# Patient Record
Sex: MALE | Race: WHITE | NOT HISPANIC OR LATINO | Employment: OTHER | URBAN - NONMETROPOLITAN AREA
[De-identification: names, ages, dates, MRNs, and addresses within clinical notes are randomized per-mention and may not be internally consistent; named-entity substitution may affect disease eponyms.]

---

## 2023-11-10 ENCOUNTER — OFFICE VISIT (OUTPATIENT)
Dept: URGENT CARE | Facility: CLINIC | Age: 68
End: 2023-11-10
Payer: MEDICARE

## 2023-11-10 ENCOUNTER — HOSPITAL ENCOUNTER (EMERGENCY)
Facility: HOSPITAL | Age: 68
Discharge: HOME | End: 2023-11-10
Attending: EMERGENCY MEDICINE
Payer: MEDICARE

## 2023-11-10 ENCOUNTER — APPOINTMENT (EMERGENCY)
Dept: RADIOLOGY | Facility: HOSPITAL | Age: 68
End: 2023-11-10
Payer: MEDICARE

## 2023-11-10 ENCOUNTER — HOSPITAL ENCOUNTER (OUTPATIENT)
Dept: RADIOLOGY | Facility: CLINIC | Age: 68
End: 2023-11-10
Payer: MEDICARE

## 2023-11-10 VITALS
WEIGHT: 265 LBS | RESPIRATION RATE: 22 BRPM | OXYGEN SATURATION: 94 % | SYSTOLIC BLOOD PRESSURE: 100 MMHG | HEART RATE: 62 BPM | TEMPERATURE: 98.1 F | DIASTOLIC BLOOD PRESSURE: 70 MMHG

## 2023-11-10 VITALS
OXYGEN SATURATION: 97 % | RESPIRATION RATE: 12 BRPM | SYSTOLIC BLOOD PRESSURE: 140 MMHG | TEMPERATURE: 97.5 F | DIASTOLIC BLOOD PRESSURE: 66 MMHG | WEIGHT: 265 LBS | HEART RATE: 71 BPM

## 2023-11-10 DIAGNOSIS — R06.02 SOB (SHORTNESS OF BREATH): Primary | ICD-10-CM

## 2023-11-10 DIAGNOSIS — R06.2 WHEEZING: ICD-10-CM

## 2023-11-10 DIAGNOSIS — R06.02 SOB (SHORTNESS OF BREATH): ICD-10-CM

## 2023-11-10 DIAGNOSIS — J18.9 PNEUMONIA: Primary | ICD-10-CM

## 2023-11-10 LAB
ALBUMIN SERPL BCP-MCNC: 4.3 G/DL (ref 3.5–5)
ALP SERPL-CCNC: 81 U/L (ref 34–104)
ALT SERPL W P-5'-P-CCNC: 22 U/L (ref 7–52)
ANION GAP SERPL CALCULATED.3IONS-SCNC: 8 MMOL/L
AST SERPL W P-5'-P-CCNC: 19 U/L (ref 13–39)
BASE EX.OXY STD BLDV CALC-SCNC: 51.7 % (ref 60–80)
BASE EXCESS BLDV CALC-SCNC: 1.1 MMOL/L
BASOPHILS # BLD AUTO: 0.05 THOUSANDS/ÂΜL (ref 0–0.1)
BASOPHILS NFR BLD AUTO: 1 % (ref 0–1)
BILIRUB SERPL-MCNC: 0.87 MG/DL (ref 0.2–1)
BNP SERPL-MCNC: 65 PG/ML (ref 0–100)
BUN SERPL-MCNC: 14 MG/DL (ref 5–25)
CALCIUM SERPL-MCNC: 9.4 MG/DL (ref 8.4–10.2)
CARDIAC TROPONIN I PNL SERPL HS: 7 NG/L
CHLORIDE SERPL-SCNC: 100 MMOL/L (ref 96–108)
CO2 SERPL-SCNC: 28 MMOL/L (ref 21–32)
CREAT SERPL-MCNC: 1.1 MG/DL (ref 0.6–1.3)
EOSINOPHIL # BLD AUTO: 0.49 THOUSAND/ÂΜL (ref 0–0.61)
EOSINOPHIL NFR BLD AUTO: 7 % (ref 0–6)
ERYTHROCYTE [DISTWIDTH] IN BLOOD BY AUTOMATED COUNT: 12.9 % (ref 11.6–15.1)
FLUAV RNA RESP QL NAA+PROBE: NEGATIVE
FLUBV RNA RESP QL NAA+PROBE: NEGATIVE
GFR SERPL CREATININE-BSD FRML MDRD: 69 ML/MIN/1.73SQ M
GLUCOSE SERPL-MCNC: 98 MG/DL (ref 65–140)
HCO3 BLDV-SCNC: 28.7 MMOL/L (ref 24–30)
HCT VFR BLD AUTO: 52 % (ref 36.5–49.3)
HGB BLD-MCNC: 17.3 G/DL (ref 12–17)
IMM GRANULOCYTES # BLD AUTO: 0.02 THOUSAND/UL (ref 0–0.2)
IMM GRANULOCYTES NFR BLD AUTO: 0 % (ref 0–2)
LACTATE SERPL-SCNC: 1.7 MMOL/L (ref 0.5–2)
LYMPHOCYTES # BLD AUTO: 1.08 THOUSANDS/ÂΜL (ref 0.6–4.47)
LYMPHOCYTES NFR BLD AUTO: 16 % (ref 14–44)
MCH RBC QN AUTO: 31 PG (ref 26.8–34.3)
MCHC RBC AUTO-ENTMCNC: 33.3 G/DL (ref 31.4–37.4)
MCV RBC AUTO: 93 FL (ref 82–98)
MONOCYTES # BLD AUTO: 0.88 THOUSAND/ÂΜL (ref 0.17–1.22)
MONOCYTES NFR BLD AUTO: 13 % (ref 4–12)
NEUTROPHILS # BLD AUTO: 4.33 THOUSANDS/ÂΜL (ref 1.85–7.62)
NEUTS SEG NFR BLD AUTO: 63 % (ref 43–75)
NRBC BLD AUTO-RTO: 0 /100 WBCS
O2 CT BLDV-SCNC: 13.2 ML/DL
PCO2 BLDV: 55.8 MM HG (ref 42–50)
PH BLDV: 7.33 [PH] (ref 7.3–7.4)
PLATELET # BLD AUTO: 213 THOUSANDS/UL (ref 149–390)
PMV BLD AUTO: 9.8 FL (ref 8.9–12.7)
PO2 BLDV: 30.6 MM HG (ref 35–45)
POTASSIUM SERPL-SCNC: 4 MMOL/L (ref 3.5–5.3)
PROT SERPL-MCNC: 6.7 G/DL (ref 6.4–8.4)
RBC # BLD AUTO: 5.58 MILLION/UL (ref 3.88–5.62)
RSV RNA RESP QL NAA+PROBE: NEGATIVE
SARS-COV-2 RNA RESP QL NAA+PROBE: NEGATIVE
SODIUM SERPL-SCNC: 136 MMOL/L (ref 135–147)
WBC # BLD AUTO: 6.85 THOUSAND/UL (ref 4.31–10.16)

## 2023-11-10 PROCEDURE — G0463 HOSPITAL OUTPT CLINIC VISIT: HCPCS

## 2023-11-10 PROCEDURE — 84484 ASSAY OF TROPONIN QUANT: CPT | Performed by: EMERGENCY MEDICINE

## 2023-11-10 PROCEDURE — 71046 X-RAY EXAM CHEST 2 VIEWS: CPT

## 2023-11-10 PROCEDURE — 80053 COMPREHEN METABOLIC PANEL: CPT | Performed by: EMERGENCY MEDICINE

## 2023-11-10 PROCEDURE — 83605 ASSAY OF LACTIC ACID: CPT | Performed by: EMERGENCY MEDICINE

## 2023-11-10 PROCEDURE — 82805 BLOOD GASES W/O2 SATURATION: CPT | Performed by: EMERGENCY MEDICINE

## 2023-11-10 PROCEDURE — 85025 COMPLETE CBC W/AUTO DIFF WBC: CPT | Performed by: EMERGENCY MEDICINE

## 2023-11-10 PROCEDURE — 93005 ELECTROCARDIOGRAM TRACING: CPT

## 2023-11-10 PROCEDURE — 83880 ASSAY OF NATRIURETIC PEPTIDE: CPT | Performed by: EMERGENCY MEDICINE

## 2023-11-10 PROCEDURE — 36415 COLL VENOUS BLD VENIPUNCTURE: CPT | Performed by: EMERGENCY MEDICINE

## 2023-11-10 PROCEDURE — 87040 BLOOD CULTURE FOR BACTERIA: CPT | Performed by: EMERGENCY MEDICINE

## 2023-11-10 PROCEDURE — 99285 EMERGENCY DEPT VISIT HI MDM: CPT | Performed by: EMERGENCY MEDICINE

## 2023-11-10 PROCEDURE — 99203 OFFICE O/P NEW LOW 30 MIN: CPT

## 2023-11-10 PROCEDURE — 96374 THER/PROPH/DIAG INJ IV PUSH: CPT

## 2023-11-10 PROCEDURE — 0241U HB NFCT DS VIR RESP RNA 4 TRGT: CPT | Performed by: EMERGENCY MEDICINE

## 2023-11-10 PROCEDURE — 99285 EMERGENCY DEPT VISIT HI MDM: CPT

## 2023-11-10 RX ORDER — PANTOPRAZOLE SODIUM 40 MG/1
40 TABLET, DELAYED RELEASE ORAL DAILY
COMMUNITY

## 2023-11-10 RX ORDER — LEVOFLOXACIN 750 MG/1
750 TABLET ORAL EVERY 24 HOURS
Qty: 7 TABLET | Refills: 0 | Status: SHIPPED | OUTPATIENT
Start: 2023-11-10 | End: 2023-11-17

## 2023-11-10 RX ORDER — ERGOCALCIFEROL 1.25 MG/1
CAPSULE ORAL
COMMUNITY
Start: 2023-09-11

## 2023-11-10 RX ORDER — FEXOFENADINE HCL 180 MG/1
180 TABLET ORAL DAILY
COMMUNITY

## 2023-11-10 RX ORDER — IPRATROPIUM BROMIDE AND ALBUTEROL SULFATE 2.5; .5 MG/3ML; MG/3ML
3 SOLUTION RESPIRATORY (INHALATION) ONCE
Status: COMPLETED | OUTPATIENT
Start: 2023-11-10 | End: 2023-11-10

## 2023-11-10 RX ORDER — FLUTICASONE PROPIONATE 110 UG/1
2 AEROSOL, METERED RESPIRATORY (INHALATION) 2 TIMES DAILY
COMMUNITY
Start: 2023-06-01

## 2023-11-10 RX ORDER — PREDNISONE 20 MG/1
20 TABLET ORAL DAILY
Qty: 5 TABLET | Refills: 0 | Status: SHIPPED | OUTPATIENT
Start: 2023-11-10 | End: 2023-11-15

## 2023-11-10 RX ORDER — LEVOFLOXACIN 750 MG/1
750 TABLET ORAL ONCE
Status: COMPLETED | OUTPATIENT
Start: 2023-11-10 | End: 2023-11-10

## 2023-11-10 RX ORDER — METHYLPREDNISOLONE SODIUM SUCCINATE 125 MG/2ML
80 INJECTION, POWDER, LYOPHILIZED, FOR SOLUTION INTRAMUSCULAR; INTRAVENOUS ONCE
Status: COMPLETED | OUTPATIENT
Start: 2023-11-10 | End: 2023-11-10

## 2023-11-10 RX ORDER — CLOPIDOGREL BISULFATE 75 MG/1
75 TABLET ORAL DAILY
COMMUNITY

## 2023-11-10 RX ORDER — ASPIRIN 81 MG/1
TABLET ORAL
COMMUNITY

## 2023-11-10 RX ORDER — ATORVASTATIN CALCIUM 80 MG/1
TABLET, FILM COATED ORAL
COMMUNITY
Start: 2023-10-04

## 2023-11-10 RX ORDER — LEVOTHYROXINE SODIUM 25 MCG
TABLET ORAL
COMMUNITY

## 2023-11-10 RX ORDER — LEVOTHYROXINE SODIUM 200 MCG
TABLET ORAL
COMMUNITY

## 2023-11-10 RX ORDER — ALBUTEROL SULFATE 90 UG/1
2 AEROSOL, METERED RESPIRATORY (INHALATION) EVERY 6 HOURS PRN
Qty: 6.7 G | Refills: 0 | Status: SHIPPED | OUTPATIENT
Start: 2023-11-10

## 2023-11-10 RX ADMIN — IPRATROPIUM BROMIDE AND ALBUTEROL SULFATE 3 ML: 2.5; .5 SOLUTION RESPIRATORY (INHALATION) at 14:40

## 2023-11-10 RX ADMIN — METHYLPREDNISOLONE SODIUM SUCCINATE 80 MG: 125 INJECTION, POWDER, FOR SOLUTION INTRAMUSCULAR; INTRAVENOUS at 14:38

## 2023-11-10 RX ADMIN — IPRATROPIUM BROMIDE AND ALBUTEROL SULFATE 3 ML: 2.5; .5 SOLUTION RESPIRATORY (INHALATION) at 12:56

## 2023-11-10 RX ADMIN — LEVOFLOXACIN 750 MG: 750 TABLET, FILM COATED ORAL at 15:22

## 2023-11-10 NOTE — ED PROVIDER NOTES
History  Chief Complaint   Patient presents with    Shortness of Breath     Patient presents ot the ED with complaints of cold symptoms since Wednesday. Today he feels short of breath. History provided by:  Medical records and patient  URI  Presenting symptoms: congestion, cough, fatigue, fever, rhinorrhea and sore throat    Severity:  Moderate  Onset quality:  Gradual  Duration:  1 day  Timing:  Constant  Progression:  Unchanged  Chronicity:  New  Relieved by:  Nebulizer treatments (Patient was in urgent care prior to arrival for similar symptoms, was given a breathing treatment, he had oxygen level of 94% on room air, after treatment went down to 90%. Sent to the emergency room for further evaluation)  Worsened by:  Nothing  Ineffective treatments:  None tried  Associated symptoms: no arthralgias    Risk factors: chronic cardiac disease and sick contacts        Prior to Admission Medications   Prescriptions Last Dose Informant Patient Reported? Taking? Synthroid 200 MCG tablet   Yes No   Sig: TAKE 1 TABLET (200 MCG TOTAL) BY MOUTH DAILY. TAKE 1 DAILY WITH 25MCG TAB   Synthroid 25 MCG tablet   Yes No   Sig: TAKE 1 TABLET (25 MCG TOTAL) BY MOUTH DAILY. WITH 200 MCG TO = 225 MCG DAILY.    aspirin (ECOTRIN LOW STRENGTH) 81 mg EC tablet   Yes No   Sig: take 1 tablet (81 mg) by oral route once daily   atorvastatin (LIPITOR) 80 mg tablet   Yes No   Sig: TAKE 1 TABLET (80 MG) BY MOUTH IN THE MORNING   clopidogrel (PLAVIX) 75 mg tablet   Yes No   Sig: Take 75 mg by mouth daily   ergocalciferol (ERGOCALCIFEROL) 1.25 MG (00801 UT) capsule   Yes No   Sig: TAKE 1 CAPSULE BY MOUTH ONE TIME PER WEEK   fexofenadine (ALLEGRA) 180 MG tablet   Yes No   Sig: Take 180 mg by mouth daily   fluticasone (FLOVENT HFA) 110 MCG/ACT inhaler   Yes No   Sig: Inhale 2 puffs 2 (two) times a day   metoprolol tartrate (LOPRESSOR) 25 mg tablet   Yes No   Sig: TAKE 1 TABLET BY MOUTH IN THE MORNING AND BEFORE BEDTIME   pantoprazole (PROTONIX) 40 mg tablet   Yes No   Sig: Take 40 mg by mouth daily      Facility-Administered Medications Last Administration Doses Remaining   ipratropium-albuterol (DUO-NEB) 0.5-2.5 mg/3 mL inhalation solution 3 mL 11/10/2023 12:56 PM 0          Past Medical History:   Diagnosis Date    Aortic aneurysm (HCC)     Disease of thyroid gland     Myocardial infarction [facility])        Past Surgical History:   Procedure Laterality Date    CARDIAC SURGERY      CHOLECYSTECTOMY      HERNIA REPAIR      REPLACEMENT TOTAL KNEE BILATERAL      US GUIDED VASCULAR ACCESS  8/17/2022       History reviewed. No pertinent family history. I have reviewed and agree with the history as documented. E-Cigarette/Vaping    E-Cigarette Use Never User      E-Cigarette/Vaping Substances     Social History     Tobacco Use    Smoking status: Never    Smokeless tobacco: Never   Vaping Use    Vaping Use: Never used   Substance Use Topics    Alcohol use: Never    Drug use: Never       Review of Systems   Constitutional:  Positive for fatigue and fever. Negative for appetite change and chills. HENT:  Positive for congestion, rhinorrhea and sore throat. Negative for trouble swallowing. Eyes:  Negative for pain, discharge and visual disturbance. Respiratory:  Positive for cough. Cardiovascular:  Negative for palpitations and leg swelling. Gastrointestinal:  Negative for nausea. Endocrine: Negative for polydipsia, polyphagia and polyuria. Genitourinary:  Negative for difficulty urinating, dysuria, hematuria and testicular pain. Musculoskeletal:  Negative for arthralgias and back pain. Skin:  Negative for color change. Allergic/Immunologic: Negative for immunocompromised state. Neurological:  Negative for dizziness, seizures, syncope and weakness. Hematological:  Negative for adenopathy. Psychiatric/Behavioral:  Negative for confusion and dysphoric mood. All other systems reviewed and are negative.       Physical Exam  Physical Exam  Vitals and nursing note reviewed. Constitutional:       General: He is not in acute distress. Appearance: Normal appearance. He is obese. He is not ill-appearing, toxic-appearing or diaphoretic. HENT:      Head: Normocephalic and atraumatic. Right Ear: Tympanic membrane, ear canal and external ear normal. There is no impacted cerumen. Left Ear: Tympanic membrane, ear canal and external ear normal. There is no impacted cerumen. Nose: Nose normal. No congestion or rhinorrhea. Mouth/Throat:      Mouth: Mucous membranes are moist.      Pharynx: Oropharynx is clear. No oropharyngeal exudate or posterior oropharyngeal erythema. Eyes:      General:         Right eye: No discharge. Left eye: No discharge. Cardiovascular:      Rate and Rhythm: Normal rate and regular rhythm. Pulses: Normal pulses. Heart sounds: Normal heart sounds. No murmur heard. No gallop. Comments: Mid sternotomy scar well-healed  Pulmonary:      Effort: Pulmonary effort is normal. No respiratory distress. Breath sounds: No stridor. Wheezing and rales present. No rhonchi. Comments: Diffuse mild expiratory wheezing, bibasilar Rales significantly louder in the right lower base  Chest:      Chest wall: No tenderness. Abdominal:      General: Bowel sounds are normal. There is no distension. Palpations: Abdomen is soft. There is no mass. Tenderness: There is no abdominal tenderness. There is no right CVA tenderness, left CVA tenderness, guarding or rebound. Hernia: No hernia is present. Musculoskeletal:         General: Normal range of motion. Cervical back: Normal range of motion and neck supple. Skin:     General: Skin is warm and dry. Capillary Refill: Capillary refill takes less than 2 seconds. Neurological:      General: No focal deficit present. Mental Status: He is alert and oriented to person, place, and time. Cranial Nerves:  No cranial nerve deficit. Sensory: No sensory deficit. Motor: No weakness. Coordination: Coordination normal.      Gait: Gait normal.      Deep Tendon Reflexes: Reflexes normal.   Psychiatric:         Mood and Affect: Mood normal.         Behavior: Behavior normal.         Thought Content: Thought content normal.         Judgment: Judgment normal.         Vital Signs  ED Triage Vitals [11/10/23 1406]   Temperature Pulse Respirations Blood Pressure SpO2   97.5 °F (36.4 °C) 67 20 (!) 174/84 94 %      Temp Source Heart Rate Source Patient Position - Orthostatic VS BP Location FiO2 (%)   Temporal Monitor Sitting Left arm --      Pain Score       --           Vitals:    11/10/23 1406 11/10/23 1445 11/10/23 1500   BP: (!) 174/84 134/67 140/66   Pulse: 67 67 71   Patient Position - Orthostatic VS: Sitting Lying Lying         Visual Acuity      ED Medications  Medications   ipratropium-albuterol (DUO-NEB) 0.5-2.5 mg/3 mL inhalation solution 3 mL (3 mL Nebulization Given 11/10/23 1440)   methylPREDNISolone sodium succinate (Solu-MEDROL) injection 80 mg (80 mg Intravenous Given 11/10/23 1438)   levofloxacin (LEVAQUIN) tablet 750 mg (750 mg Oral Given 11/10/23 1522)       Diagnostic Studies  Results Reviewed       Procedure Component Value Units Date/Time    FLU/RSV/COVID - if FLU/RSV clinically relevant [[id number]]  (Normal) Collected: 11/10/23 1424    Lab Status: Final result Specimen: Nares from Nose Updated: 11/10/23 1513     SARS-CoV-2 Negative     INFLUENZA A PCR Negative     INFLUENZA B PCR Negative     RSV PCR Negative    Narrative:      FOR PEDIATRIC PATIENTS - copy/paste COVID Guidelines URL to browser: [email or web address]    SARS-CoV-2 assay is a Nucleic Acid Amplification assay intended for the  qualitative detection of nucleic acid from SARS-CoV-2 in nasopharyngeal  swabs.  Results are for the presumptive identification of SARS-CoV-2 RNA.    Positive results are indicative of infection with SARS-CoV-2, the virus  causing COVID-19, but do not rule out bacterial infection or co-infection  with other viruses. Laboratories within the [facility] and its  territories are required to report all positive results to the appropriate  public health authorities. Negative results do not preclude SARS-CoV-2  infection and should not be used as the sole basis for treatment or other  patient management decisions. Negative results must be combined with  clinical observations, patient history, and epidemiological information. This test has not been FDA cleared or approved. This test has been authorized by FDA under an Emergency Use Authorization  (EUA). This test is only authorized for the duration of time the  declaration that circumstances exist justifying the authorization of the  emergency use of an in vitro diagnostic tests for detection of SARS-CoV-2  virus and/or diagnosis of COVID-19 infection under section 564(b)(1) of  the Act, 21 U. S.C. 165RMG-5(B)(7), unless the authorization is terminated  or revoked sooner. The test has been validated but independent review by FDA  and CLIA is pending. Test performed using [employer] GeneXpert: This RT-PCR assay targets N2,  a region unique to SARS-CoV-2. A conserved region in the E-gene was chosen  for pan-Sarbecovirus detection which includes SARS-CoV-2. According to CMS-2020-01-R, this platform meets the definition of high-throughput technology.     B-Type Natriuretic Peptide(BNP) [[id number]]  (Normal) Collected: 11/10/23 1424    Lab Status: Final result Specimen: Blood from Arm, Left Updated: 11/10/23 1459     BNP 65 pg/mL     HS Troponin 0hr (reflex protocol) [[id number]]  (Normal) Collected: 11/10/23 1424    Lab Status: Final result Specimen: Blood from Arm, Left Updated: 11/10/23 1459     hs TnI 0hr 7 ng/L     Comprehensive metabolic panel [[id number]] Collected: 11/10/23 1424    Lab Status: Final result Specimen: Blood from Arm, Left Updated: 11/10/23 1456     Sodium 136 mmol/L      Potassium 4.0 mmol/L      Chloride 100 mmol/L      CO2 28 mmol/L      ANION GAP 8 mmol/L      BUN 14 mg/dL      Creatinine 1.10 mg/dL      Glucose 98 mg/dL      Calcium 9.4 mg/dL      AST 19 U/L      ALT 22 U/L      Alkaline Phosphatase 81 U/L      Total Protein 6.7 g/dL      Albumin 4.3 g/dL      Total Bilirubin 0.87 mg/dL      eGFR 69 ml/min/1.73sq m     Narrative:      [facility] (CKD):     Stage 1 with normal or high GFR (GFR > 90 mL/min/1.73 square meters)    Stage 2 Mild CKD (GFR = 60-89 mL/min/1.73 square meters)    Stage 3A Moderate CKD (GFR = 45-59 mL/min/1.73 square meters)    Stage 3B Moderate CKD (GFR = 30-44 mL/min/1.73 square meters)    Stage 4 Severe CKD (GFR = 15-29 mL/min/1.73 square meters)    Stage 5 End Stage CKD (GFR <15 mL/min/1.73 square meters)  Note: GFR calculation is accurate only with a steady state creatinine    Lactic acid, plasma (w/reflex if result > 2.0) [[id number]]  (Normal) Collected: 11/10/23 1424    Lab Status: Final result Specimen: Blood from Arm, Left Updated: 11/10/23 1454     LACTIC ACID 1.7 mmol/L     Narrative:      Result may be elevated if tourniquet was used during collection. Blood culture #1 [[id number]] Collected: 11/10/23 1436    Lab Status:  In process Specimen: Blood from Arm, Right Updated: 11/10/23 1438    Blood gas, venous [[id number]]  (Abnormal) Collected: 11/10/23 1424    Lab Status: Final result Specimen: Blood from Arm, Left Updated: 11/10/23 1437     pH, Andrews 7.329     pCO2, Andrews 55.8 mm Hg      pO2, Andrews 30.6 mm Hg      HCO3, Andrews 28.7 mmol/L      Base Excess, Andrews 1.1 mmol/L      O2 Content, Andrews 13.2 ml/dL      O2 HGB, VENOUS 51.7 %     CBC and differential [[id number]]  (Abnormal) Collected: 11/10/23 1424    Lab Status: Final result Specimen: Blood from Arm, Left Updated: 11/10/23 1435     WBC 6.85 Thousand/uL RBC 5.58 Million/uL      Hemoglobin 17.3 g/dL      Hematocrit 52.0 %      MCV 93 fL      MCH 31.0 pg      MCHC 33.3 g/dL      RDW 12.9 %      MPV 9.8 fL      Platelets 085 Thousands/uL      nRBC 0 /100 WBCs      Neutrophils Relative 63 %      Immat GRANS % 0 %      Lymphocytes Relative 16 %      Monocytes Relative 13 %      Eosinophils Relative 7 %      Basophils Relative 1 %      Neutrophils Absolute 4.33 Thousands/µL      Immature Grans Absolute 0.02 Thousand/uL      Lymphocytes Absolute 1.08 Thousands/µL      Monocytes Absolute 0.88 Thousand/µL      Eosinophils Absolute 0.49 Thousand/µL      Basophils Absolute 0.05 Thousands/µL     Blood culture #2 [[id number]] Collected: 11/10/23 1424    Lab Status: In process Specimen: Blood from Arm, Left Updated: 11/10/23 1432                   No orders to display              Procedures  Procedures         ED Course                               SBIRT 22yo+      Flowsheet Row Most Recent Value   Initial Alcohol Screen: US AUDIT-C     1. How often do you have a drink containing alcohol? 0 Filed at: 11/10/2023 1408   2. How many drinks containing alcohol do you have on a typical day you are drinking? 0 Filed at: 11/10/2023 1408   3a. Male UNDER 65: How often do you have five or more drinks on one occasion? 0 Filed at: 11/10/2023 1408   3b. FEMALE Any Age, or MALE 65+: How often do you have 4 or more drinks on one occassion? 0 Filed at: 11/10/2023 1408   Audit-C Score 0 Filed at: 11/10/2023 1408   MAXIMUS: How many times in the past year have you. .. Used an illegal drug or used a prescription medication for non-medical reasons? Never Filed at: 11/10/2023 [address] [place] [address]: Patient appears well, vital signs reviewed. Patient with URI symptoms, new onset wheezing and noted to have some Rales to the right base. Concerns for pneumonia. Placed on a monitor. Plan to complete basic labs including cardiac enzymes, BNP.   EKG nonischemic reports atrial fibrillation on heading however appears to have normal sinus rhythm with right bundle branch block and several PVCs. Check chest x-ray. I will give DuoNeb and Solu-Medrol, reevaluate. No DVT/PE symptomatology or stigmata. 1510: Chest x-ray and labs reviewed. Patient feels much better with above treatment. Stable for outpatient course of care. Will start on antibiotics for presumed bronchitis/pneumonia. Encouraged to return to the emergency room if his symptoms do not improve. Amount and/or Complexity of Data Reviewed  Labs: ordered. Radiology: ordered and independent interpretation performed. Details: Chest x-ray from urgent care without acute pathology  ECG/medicine tests: ordered and independent interpretation performed. Details: Normal sinus rhythm with right bundle branch block 65 bpm, no acute ischemia, Q waves inferiorly. Risk  Prescription drug management. Disposition  Final diagnoses:   Pneumonia   Wheezing     Time reflects when diagnosis was documented in both MDM as applicable and the Disposition within this note       Time User Action Codes Description Comment    11/10/2023  3:06 PM Katia Endo Add [J18.9] Pneumonia     11/10/2023  3:06 PM Katia Endo Add [R06.2] Wheezing           ED Disposition       ED Disposition   Discharge    Condition   Stable    Date/Time   Fri Nov 10, 2023 [facility] discharge to home/self care.                    Follow-up Information       Follow up With Specialties Details Why Contact Info    Stephan Godfrey MD Internal Medicine Schedule an appointment as soon as possible for a visit   [facility] [phone]              Discharge Medication List as of 11/10/2023  3:10 PM        START taking these medications    Details   albuterol (Proventil HFA) 90 mcg/act inhaler Inhale 2 puffs every 6 (six) hours as needed for wheezing, Starting Fri 11/10/2023, Normal levofloxacin (LEVAQUIN) 750 mg tablet Take 1 tablet (750 mg total) by mouth every 24 hours for 7 days, Starting Fri 11/10/2023, Until Fri 11/17/2023, Normal      predniSONE 20 mg tablet Take 1 tablet (20 mg total) by mouth daily for 5 days, Starting Fri 11/10/2023, Until Wed 11/15/2023, Normal           CONTINUE these medications which have NOT CHANGED    Details   aspirin (ECOTRIN LOW STRENGTH) 81 mg EC tablet take 1 tablet (81 mg) by oral route once daily, Historical Med      atorvastatin (LIPITOR) 80 mg tablet TAKE 1 TABLET (80 MG) BY MOUTH IN THE MORNING, Historical Med      clopidogrel (PLAVIX) 75 mg tablet Take 75 mg by mouth daily, Historical Med      ergocalciferol (ERGOCALCIFEROL) 1.25 MG (40629 UT) capsule TAKE 1 CAPSULE BY MOUTH ONE TIME PER WEEK, Historical Med      fexofenadine (ALLEGRA) 180 MG tablet Take 180 mg by mouth daily, Historical Med      fluticasone (FLOVENT HFA) 110 MCG/ACT inhaler Inhale 2 puffs 2 (two) times a day, Starting Thu 6/1/2023, Historical Med      metoprolol tartrate (LOPRESSOR) 25 mg tablet TAKE 1 TABLET BY MOUTH IN THE MORNING AND BEFORE BEDTIME, Historical Med      pantoprazole (PROTONIX) 40 mg tablet Take 40 mg by mouth daily, Historical Med      !! Synthroid 200 MCG tablet TAKE 1 TABLET (200 MCG TOTAL) BY MOUTH DAILY. TAKE 1 DAILY WITH 25MCG TAB, Historical Med      !! Synthroid 25 MCG tablet TAKE 1 TABLET (25 MCG TOTAL) BY MOUTH DAILY. WITH 200 MCG TO = 225 MCG DAILY. , Historical Med       !! - Potential duplicate medications found. Please discuss with provider. No discharge procedures on file.     PDMP Review       None            ED Provider  Electronically Signed by             Yanique Larson MD  11/10/23 1859

## 2023-11-10 NOTE — PROGRESS NOTES
[facility]        NAME: Yehuda Wang is a 80 y.o. male  : 1955    MRN: [id number]  DATE: November 10, 2023  TIME: 1:20 PM    Assessment and Plan   SOB (shortness of breath) [R06.02]  1. SOB (shortness of breath)  XR chest pa & lateral    ipratropium-albuterol (DUO-NEB) 0.5-2.5 mg/3 mL inhalation solution 3 mL    Transfer to other facility          Discussed problem with patient and his wife. . Cxr revealed no acute abnormalities but awaiting official radiology interpretation. Minor diffuse wheezing on exam, long time smoker but not currently. Duoneb today with no improvement, o2 stat remains 90-94%. Opting to go to er for further evaulation    Patient Instructions       Follow up with PCP in 3-5 days. Proceed to  ER if symptoms worsen. Chief Complaint     Chief Complaint   Patient presents with   • Cough     C/o cough, wheezing, and SOB that began yesterday         History of Present Illness       C/o cough, wheezing, and SOB that began yesterday. PMH of CABG, FATOU, non-current smoker. Worse at night and gets better in the day. Also reports congestion, runny nose, sore throat. Eating and drinking normally and denies any fevers or chills. No tylenol or ibuprofen. Denies any bloody sputum, chest pain, lightheadedness, dizziness. Cough  Associated symptoms include postnasal drip, rhinorrhea, a sore throat, shortness of breath and wheezing. Pertinent negatives include no chest pain, chills, ear pain, fever, headaches or myalgias. Review of Systems   Review of Systems   Constitutional:  Negative for appetite change, chills, fatigue and fever. HENT:  Positive for congestion, postnasal drip, rhinorrhea and sore throat. Negative for ear pain, sinus pressure and sinus pain. Respiratory:  Positive for cough, shortness of breath and wheezing. Negative for chest tightness and stridor. No chest pain, no pleutric chest pain   Cardiovascular:  Negative for chest pain and palpitations. Gastrointestinal:  Negative for abdominal pain, constipation, diarrhea, nausea and vomiting. Musculoskeletal:  Negative for myalgias. Neurological:  Negative for dizziness, syncope, light-headedness and headaches. Current Medications       Current Outpatient Medications:   •  aspirin (ECOTRIN LOW STRENGTH) 81 mg EC tablet, take 1 tablet (81 mg) by oral route once daily, Disp: , Rfl:   •  atorvastatin (LIPITOR) 80 mg tablet, TAKE 1 TABLET (80 MG) BY MOUTH IN THE MORNING, Disp: , Rfl:   •  clopidogrel (PLAVIX) 75 mg tablet, Take 75 mg by mouth daily, Disp: , Rfl:   •  ergocalciferol (ERGOCALCIFEROL) 1.25 MG (04320 UT) capsule, TAKE 1 CAPSULE BY MOUTH ONE TIME PER WEEK, Disp: , Rfl:   •  fexofenadine (ALLEGRA) 180 MG tablet, Take 180 mg by mouth daily, Disp: , Rfl:   •  fluticasone (FLOVENT HFA) 110 MCG/ACT inhaler, Inhale 2 puffs 2 (two) times a day, Disp: , Rfl:   •  metoprolol tartrate (LOPRESSOR) 25 mg tablet, TAKE 1 TABLET BY MOUTH IN THE MORNING AND BEFORE BEDTIME, Disp: , Rfl:   •  pantoprazole (PROTONIX) 40 mg tablet, Take 40 mg by mouth daily, Disp: , Rfl:   •  Synthroid 200 MCG tablet, TAKE 1 TABLET (200 MCG TOTAL) BY MOUTH DAILY. TAKE 1 DAILY WITH 25MCG TAB, Disp: , Rfl:   •  Synthroid 25 MCG tablet, TAKE 1 TABLET (25 MCG TOTAL) BY MOUTH DAILY. WITH 200 MCG TO = 225 MCG DAILY. , Disp: , Rfl:   No current facility-administered medications for this visit.     Current Allergies     Allergies as of 11/10/2023 - Reviewed 11/10/2023   Allergen Reaction Noted   • Other Hives 01/13/2011            The following portions of the patient's history were reviewed and updated as appropriate: allergies, current medications, past family history, past medical history, past social history, past surgical history and problem list.     Past Medical History:   Diagnosis Date   • Aortic aneurysm ([address])    • Disease of thyroid gland    • Myocardial infarction [facility])        Past Surgical History:   Procedure Laterality Date   • CARDIAC SURGERY     • CHOLECYSTECTOMY     • HERNIA REPAIR     • REPLACEMENT TOTAL KNEE BILATERAL     • US GUIDED VASCULAR ACCESS  8/17/2022       History reviewed. No pertinent family history. Medications have been verified. Objective   /70   Pulse 62   Temp 98.1 °F (36.7 °C)   Resp 22   Wt 120 kg (265 lb)   SpO2 94%        Physical Exam     Physical Exam  Vitals and nursing note reviewed. Constitutional:       General: He is not in acute distress. Appearance: Normal appearance. He is normal weight. He is not ill-appearing, toxic-appearing or diaphoretic. HENT:      Head: Normocephalic. Right Ear: Tympanic membrane, ear canal and external ear normal. There is no impacted cerumen. Left Ear: Tympanic membrane, ear canal and external ear normal. There is no impacted cerumen. Nose: Congestion and rhinorrhea present. Mouth/Throat:      Mouth: Mucous membranes are moist.      Pharynx: Oropharynx is clear. No oropharyngeal exudate or posterior oropharyngeal erythema. Eyes:      General:         Right eye: No discharge. Left eye: No discharge. Extraocular Movements: Extraocular movements intact. Conjunctiva/sclera: Conjunctivae normal.      Pupils: Pupils are equal, round, and reactive to light. Neck:      Vascular: No carotid bruit. Cardiovascular:      Rate and Rhythm: Normal rate and regular rhythm. Pulses: Normal pulses. Heart sounds: Normal heart sounds. No murmur heard. No friction rub. No gallop. Pulmonary:      Effort: Pulmonary effort is normal. No tachypnea, bradypnea, accessory muscle usage, prolonged expiration, respiratory distress or retractions. Breath sounds: No stridor or decreased air movement. Wheezing (diffuse, minor expiratory wheeze) present. No decreased breath sounds, rhonchi or rales. Chest:      Chest wall: No tenderness.    Musculoskeletal:      Cervical back: Normal range of motion and neck supple. No rigidity or tenderness. Right lower leg: No edema. Left lower leg: No edema. Lymphadenopathy:      Cervical: No cervical adenopathy. Neurological:      Mental Status: He is alert.

## 2023-11-12 LAB
BACTERIA BLD CULT: NORMAL
BACTERIA BLD CULT: NORMAL
QRS AXIS: -77 DEGREES
QRSD INTERVAL: 120 MS
QT INTERVAL: 436 MS
QTC INTERVAL: 453 MS
T WAVE AXIS: 65 DEGREES
VENTRICULAR RATE: 65 BPM

## 2023-11-12 PROCEDURE — 93010 ELECTROCARDIOGRAM REPORT: CPT | Performed by: STUDENT IN AN ORGANIZED HEALTH CARE EDUCATION/TRAINING PROGRAM

## 2023-11-15 LAB
BACTERIA BLD CULT: NORMAL
BACTERIA BLD CULT: NORMAL

## 2023-12-13 ENCOUNTER — CONSULT (OUTPATIENT)
Dept: PULMONOLOGY | Facility: CLINIC | Age: 68
End: 2023-12-13
Payer: MEDICARE

## 2023-12-13 VITALS
OXYGEN SATURATION: 96 % | WEIGHT: 265 LBS | SYSTOLIC BLOOD PRESSURE: 158 MMHG | DIASTOLIC BLOOD PRESSURE: 77 MMHG | HEART RATE: 45 BPM | BODY MASS INDEX: 35.89 KG/M2 | HEIGHT: 72 IN | TEMPERATURE: 96.2 F

## 2023-12-13 DIAGNOSIS — J45.20 MILD INTERMITTENT REACTIVE AIRWAY DISEASE WITHOUT COMPLICATION: ICD-10-CM

## 2023-12-13 DIAGNOSIS — Z87.891 PERSONAL HISTORY OF TOBACCO USE: Primary | ICD-10-CM

## 2023-12-13 PROBLEM — Z95.1 S/P CABG X 3: Status: ACTIVE | Noted: 2023-12-13

## 2023-12-13 PROBLEM — J45.909 REACTIVE AIRWAY DISEASE WITHOUT COMPLICATION: Status: ACTIVE | Noted: 2023-12-13

## 2023-12-13 PROBLEM — I25.10 CORONARY ARTERY DISEASE INVOLVING NATIVE CORONARY ARTERY OF NATIVE HEART WITHOUT ANGINA PECTORIS: Status: ACTIVE | Noted: 2023-12-13

## 2023-12-13 PROCEDURE — 99204 OFFICE O/P NEW MOD 45 MIN: CPT | Performed by: INTERNAL MEDICINE

## 2023-12-13 NOTE — ASSESSMENT & PLAN NOTE
He is a candidate for lung cancer screening and was agreeable to do so after discussion of risks and benefits using the [facility] decision tool. Lung screening CT scan ordered.

## 2023-12-13 NOTE — PROGRESS NOTES
Pulmonary Consultation   Concepción Griffin 80 y.o. male MRN: [id number]  12/13/2023    Assessment:    Reactive airway disease without complication  It is unclear if he has asthma, COPD, an overlap between the two, or neither. Collect PFTs  Continue just rescue albuterol for now  Can resume flovent 2 puffs BID if symptoms recur prior to next anticipated difficult season, which is Spring for this patient  Follow up in 4 months    Personal history of tobacco use  He is a candidate for lung cancer screening and was agreeable to do so after discussion of risks and benefits using the [facility] decision tool. Lung screening CT scan ordered. Plan:    Diagnoses and all orders for this visit:    Personal history of tobacco use  -     CT lung screening program; Future  -     Complete PFT with post bronchodilator; Future  -  Mild intermittent reactive airway disease without complication    Return in about 4 months (around 4/13/2024). History of Present Illness   HPI:  Concepción Griffin is a 80 y.o. male who presents for evaluation of recurrent respiratory issues. His story is a little fragmented, but he is mostly referred for a recent respiratory infection. He had the sudden onset of a respiratory infection in November and had CXR imaging findings consistent with pneumonia. From that incident he is essentially fully improved. He received prednisone and antibiotics which took care of this. In the more longer term, he has had some respiratory issues for the past 1-2 years or so. In the late Spring and throughout the Summer he has dealt with mild shortness of breath, primarily with outdoor exposures and activity like golfing. He was prescribed flovent, used it only intermittently, and seemed to do fairly well with this but he never needed it prior to this year. He notes that he can exercise indoors without any issues, does so twice a week with an hour of cardio.   He was not given a rescue inhaler (until this pneumonia, anyway) but does find benefit with using one now. He was also recently added on singulair. He has a back history of sinus issues with polyps requiring prior surgery. He notes a rash when using advil but is chronically on aspirin due to the presence of a stent. His back history is only positive for coronary disease s/p stenting in 2014 and triple bypass in August.  He is a former smoker, 40 years, smoked up to 1 PPD for 20 of those and smoked cigars for the other 20. 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 There is a family history of COPD in his sister and lung cancer in his father who passed suddenly with this. Review of Systems   Respiratory:  Positive for shortness of breath and wheezing.         Historical Information   Past Medical History:   Diagnosis Date   • Aortic aneurysm ([address])    • Disease of thyroid gland    • Myocardial infarction [facility])      Past Surgical History:   Procedure Laterality Date   • CARDIAC SURGERY     • CHOLECYSTECTOMY     • HERNIA REPAIR     • REPLACEMENT TOTAL KNEE BILATERAL     • US GUIDED VASCULAR ACCESS  8/17/2022     Family History   Problem Relation Age of Onset   • Heart disease Mother    • COPD Sister        Meds/Allergies     Current Outpatient Medications:   •  albuterol (Proventil HFA) 90 mcg/act inhaler, Inhale 2 puffs every 6 (six) hours as needed for wheezing, Disp: 6.7 g, Rfl: 0  •  aspirin (ECOTRIN LOW STRENGTH) 81 mg EC tablet, take 1 tablet (81 mg) by oral route once daily, Disp: , Rfl:   •  atorvastatin (LIPITOR) 80 mg tablet, TAKE 1 TABLET (80 MG) BY MOUTH IN THE MORNING, Disp: , Rfl:   •  clopidogrel (PLAVIX) 75 mg tablet, Take 75 mg by mouth daily, Disp: , Rfl:   •  ergocalciferol (ERGOCALCIFEROL) 1.25 MG (58666 UT) capsule, TAKE 1 CAPSULE BY MOUTH ONE TIME PER WEEK, Disp: , Rfl:   •  fexofenadine (ALLEGRA) 180 MG tablet, Take 180 mg by mouth daily, Disp: , Rfl:   •  fluticasone (FLOVENT HFA) 110 MCG/ACT inhaler, Inhale 2 puffs 2 (two) times a day, Disp: , Rfl:   •  metoprolol tartrate (LOPRESSOR) 25 mg tablet, TAKE 1 TABLET BY MOUTH IN THE MORNING AND BEFORE BEDTIME, Disp: , Rfl:   •  pantoprazole (PROTONIX) 40 mg tablet, Take 40 mg by mouth daily, Disp: , Rfl:   •  Synthroid 200 MCG tablet, TAKE 1 TABLET (200 MCG TOTAL) BY MOUTH DAILY. TAKE 1 DAILY WITH 25MCG TAB, Disp: , Rfl:   •  Synthroid 25 MCG tablet, TAKE 1 TABLET (25 MCG TOTAL) BY MOUTH DAILY. WITH 200 MCG TO = 225 MCG DAILY. , Disp: , Rfl:   Allergies   Allergen Reactions   • Ibuprofen Hives   • Other Hives   • Pollen Extract Cough     pollen       Vitals: Blood pressure 158/77, pulse (!) 45, temperature (!) 96.2 °F (35.7 °C), temperature source Tympanic, height 6' (1.829 m), weight 120 kg (265 lb), SpO2 96%. Body mass index is 35.94 kg/m². Oxygen Therapy  SpO2: 96 %  Oxygen Therapy: None (Room air)    Physical Exam  Physical Exam  Vitals reviewed. Constitutional:       General: He is not in acute distress. Appearance: Normal appearance. He is well-developed. He is not ill-appearing. HENT:      Head: Normocephalic and atraumatic. Eyes:      General: No scleral icterus. Conjunctiva/sclera: Conjunctivae normal.   Neck:      Thyroid: No thyromegaly. Vascular: No JVD. Cardiovascular:      Rate and Rhythm: Normal rate and regular rhythm. Heart sounds: Normal heart sounds. No murmur heard. No friction rub. No gallop. Pulmonary:      Effort: Pulmonary effort is normal. No respiratory distress. Breath sounds: Normal breath sounds. No wheezing or rales. Musculoskeletal:      Cervical back: Neck supple. Right lower leg: No edema. Left lower leg: No edema. Skin:     General: Skin is warm and dry. Findings: No rash. Neurological:      General: No focal deficit present. Mental Status: He is alert and oriented to person, place, and time. Mental status is at baseline.    Psychiatric:         Mood and Affect: Mood normal.         Behavior: Behavior normal.         Labs: I have personally reviewed pertinent lab results. Lab Results   Component Value Date    WBC 6.85 11/10/2023    HGB 17.3 (H) 11/10/2023    HCT 52.0 (H) 11/10/2023    MCV 93 11/10/2023     11/10/2023     Lab Results   Component Value Date    CALCIUM 9.4 11/10/2023    K 4.0 11/10/2023    CO2 28 11/10/2023     11/10/2023    BUN 14 11/10/2023    CREATININE 1.10 11/10/2023     No results found for: "IGE"  Lab Results   Component Value Date    ALT 22 11/10/2023    AST 19 11/10/2023    ALKPHOS 81 11/10/2023       Imaging and other studies: I have personally reviewed relevant images in PACS. EKG, Pathology, and Other Studies: I have personally reviewed relevant reports in Epic. Candy Queen M.D.   [facility]

## 2023-12-13 NOTE — ASSESSMENT & PLAN NOTE
It is unclear if he has asthma, COPD, an overlap between the two, or neither.       Collect PFTs  Continue just rescue albuterol for now  Can resume flovent 2 puffs BID if symptoms recur prior to next anticipated difficult season, which is Spring for this patient  Follow up in 4 months

## 2023-12-21 ENCOUNTER — HOSPITAL ENCOUNTER (OUTPATIENT)
Dept: CT IMAGING | Facility: HOSPITAL | Age: 68
Discharge: HOME | End: 2023-12-21
Attending: INTERNAL MEDICINE
Payer: MEDICARE

## 2023-12-21 ENCOUNTER — HOSPITAL ENCOUNTER (OUTPATIENT)
Dept: PULMONOLOGY | Facility: HOSPITAL | Age: 68
End: 2023-12-21
Attending: INTERNAL MEDICINE
Payer: MEDICARE

## 2023-12-21 DIAGNOSIS — Z87.891 PERSONAL HISTORY OF TOBACCO USE: ICD-10-CM

## 2023-12-21 PROCEDURE — 94060 EVALUATION OF WHEEZING: CPT | Performed by: INTERNAL MEDICINE

## 2023-12-21 PROCEDURE — 94729 DIFFUSING CAPACITY: CPT

## 2023-12-21 PROCEDURE — 94060 EVALUATION OF WHEEZING: CPT

## 2023-12-21 PROCEDURE — 94726 PLETHYSMOGRAPHY LUNG VOLUMES: CPT | Performed by: INTERNAL MEDICINE

## 2023-12-21 PROCEDURE — 94726 PLETHYSMOGRAPHY LUNG VOLUMES: CPT

## 2023-12-21 PROCEDURE — 94729 DIFFUSING CAPACITY: CPT | Performed by: INTERNAL MEDICINE

## 2023-12-21 PROCEDURE — 71271 CT THORAX LUNG CANCER SCR C-: CPT

## 2023-12-21 PROCEDURE — 94760 N-INVAS EAR/PLS OXIMETRY 1: CPT

## 2023-12-21 RX ORDER — ALBUTEROL SULFATE 2.5 MG/3ML
2.5 SOLUTION RESPIRATORY (INHALATION) ONCE AS NEEDED
Status: COMPLETED | OUTPATIENT
Start: 2023-12-21 | End: 2023-12-21

## 2023-12-21 RX ADMIN — ALBUTEROL SULFATE 2.5 MG: 2.5 SOLUTION RESPIRATORY (INHALATION) at 08:28

## 2024-04-22 ENCOUNTER — OFFICE VISIT (OUTPATIENT)
Dept: PULMONOLOGY | Facility: CLINIC | Age: 69
End: 2024-04-22
Payer: MEDICARE

## 2024-04-22 VITALS
SYSTOLIC BLOOD PRESSURE: 146 MMHG | OXYGEN SATURATION: 95 % | HEIGHT: 72 IN | DIASTOLIC BLOOD PRESSURE: 78 MMHG | WEIGHT: 287.8 LBS | TEMPERATURE: 98.1 F | BODY MASS INDEX: 38.98 KG/M2 | HEART RATE: 64 BPM

## 2024-04-22 DIAGNOSIS — J31.0 CHRONIC RHINITIS: ICD-10-CM

## 2024-04-22 DIAGNOSIS — Z87.891 PERSONAL HISTORY OF TOBACCO USE: Primary | ICD-10-CM

## 2024-04-22 DIAGNOSIS — G47.33 OSA (OBSTRUCTIVE SLEEP APNEA): ICD-10-CM

## 2024-04-22 DIAGNOSIS — J45.20 MILD INTERMITTENT REACTIVE AIRWAY DISEASE WITHOUT COMPLICATION: ICD-10-CM

## 2024-04-22 PROCEDURE — 99214 OFFICE O/P EST MOD 30 MIN: CPT | Performed by: INTERNAL MEDICINE

## 2024-04-22 PROCEDURE — G2211 COMPLEX E/M VISIT ADD ON: HCPCS | Performed by: INTERNAL MEDICINE

## 2024-04-22 NOTE — PROGRESS NOTES
Progress Note - Pulmonary   Zohaib Dutton 69 y.o. male MRN: [id number]   Encounter: [id number]      Assessment/Plan:  Patient is a 69-year-old male with past medical history significant for nicotine dependence in remission, obstructive lung disease who presents for routine follow-up.  The patient credits Singulair with fixing his sinusitis which in turn is fixed his cough.  He only intermittently uses his inhalers.  Encouraged the patient use albuterol as needed.  May stop the Flovent.  Patient has nodules have been stable since 2019 therefore they continue with lung cancer screening.  Reviewed his CPAP and he is using appropriately.    Obstructive Lung Disease  -  may use albuterol PRN  -  may stop flovent    Nicotine Dependence  -  quit in 2014  -  1ppd x 25 years; cigar smoking x 10 years  -  continue annual lung cancer screening CT scans    Lung nodules  -  stable   -  continue annual lung cancer screening    Rhinitis  -  significantly improved  -  continue singulair  -  cough has improved  -  continue allegra    FATOU  -  managed by PCP  -  uses nightly   -  no issues    I discussed with him that he is a candidate for lung cancer CT screening.     The following Shared Decision-Making points were covered:  Benefits of screening were discussed, including the rates of reduction in death from lung cancer and other causes.  Harms of screening were reviewed, including false positive tests, radiation exposure levels, risks of invasive procedures, risks of complications of screening, and risk of overdiagnosis.  I counseled on the importance of adherence to annual lung cancer LDCT screening, impact of co-morbidities, and ability or willingness to undergo diagnosis and treatment.  I counseled on the importance of maintaining abstinence as a former smoker or was counseled on the importance of smoking cessation if a current smoker    Review of Eligibility Criteria: He meets all of the criteria for Lung Cancer Screening.    He is 69 y.o.   He has 20 pack year tobacco history and is a current smoker or has quit within the past 15 years  He presents no signs or symptoms of lung cancer    After discussion, the patient decided to elect lung cancer screening.    1. Personal history of tobacco use  -     CT lung screening program; Future; Expected date: 12/18/2024    2. Mild intermittent reactive airway disease without complication    3. FATOU (obstructive sleep apnea)    4. Chronic rhinitis        Patient may follow up in 8 months or sooner as necessary.     Orders:  Orders Placed This Encounter   Procedures    CT lung screening program     Standing Status:   Future     Standing Expiration Date:   4/22/2028     Scheduling Instructions:      If possible wear clothing without any metal in the chest and abdomen area.  Sweat suit, shorts, sports bra or bra without underwire may eliminate the need to change. Please arrive 15 minutes prior to your appointment time to register. On the day of the test, please bring any prior CT or MRI studies of this area with you that were not performed at a [facility] facility.            This is now being billed through your insurance and if you have a copay, it is required at time of service.  Otherwise, you can go through .              To schedule this appointment, please contact Central Scheduling at [phone].           Order Specific Question:   What is the patient's sedation requirement? If Medication for Claustrophobia is selected, order medication at this point.     Answer:   No Sedation     Order Specific Question:   Does patient have a 20 pack year smoking history? (Equivalent to 1 pack of cigarettes per day for a year) IF NO, PATIENT IS NOT ELIGIBLE FOR THIS PROGRAM.     Answer:   Yes     Order Specific Question:   Has the patient been a current smoker or one who has quit smoking within the last 15 years? IF NO, PATIENT IS NOT ELIGIBLE FOR THIS PROGRAM.     Answer:   Yes     Order  Specific Question:   Does the patient show any signs or symptoms of lung cancer?     Answer:   No     Order Specific Question:   Is this the first (baseline) CT or an annual exam?     Answer:   Annual [2]     Order Specific Question:   Release to patient through [employer]     Answer:   Immediate     Order Specific Question:   Reason for Exam     Answer:   lung cancer screening     Order Specific Question:   Is this a low dose CT or a routine CT?     Answer:   Low Dose CT [1]     Order Specific Question:   Is there documentation of shared decision making?     Answer:   Yes       Subjective:   The patient has a flovent inhaler which he uses as needed.  He uses the flovent about every 2 weeks.  He notes that he uses it if he feels short of breath.  He uses albuterol less than 1x/ month.  This was prescribed after a pneumonia.    He is not avoiding any activities. He denies significant cough.     He notes that he has had significant sinus congestion which has improved with singulair.  He is taking allegra.      Inhaler Regimen:  Flovent - 1-2x/month  Albuterol <1x/month    Answers submitted by the patient for this visit:  Pulmonology Questionnaire (Submitted on 4/21/2024)  Chief Complaint: Primary symptoms  Do you have a cough?: Yes  Do you have a hoarse voice?: Yes  Do you have a wet cough?: Yes  Do you have wheezing?: Yes  Chronicity: chronic  When did you first notice your symptoms?: more than 1 month ago  How often do your symptoms occur?: daily  Since you first noticed this problem, how has it changed?: gradually improving  Have you had a change in appetite?: No  Do you have chest pain?: No  Do you have shortness of breath that occurs with effort or exertion?: No  Do you have ear congestion?: No  Do you have ear pain?: No  Do you have a fever?: No  Do you have headaches?: No  Do you have heartburn?: No  Do you have fatigue?: No  Do you have muscle pain?: No  Do you have nasal congestion?: Yes  Do you have shortness  of breath when you wake up?: No  Do you have post-nasal drip?: Yes  Do you have a runny nose?: Yes  Do you have sneezing?: No  Do you have a sore throat?: No  Do you have sweats?: No  Do you have trouble swallowing?: No  Have you experienced weight loss?: No  Which of the following makes your symptoms worse?: pollen  Which of the following makes your symptoms better?: steroid inhaler    Remainder of review of systems negative except as described in HPI.      The following portions of the patient's history were reviewed and updated as appropriate: allergies, current medications, past family history, past medical history, past social history, past surgical history and problem list.     Objective:   Vitals: Blood pressure 146/78, pulse 64, temperature 98.1 °F (36.7 °C), temperature source Temporal, height 6' (1.829 m), weight 131 kg (287 lb 12.8 oz), SpO2 95%., RA, Body mass index is 39.03 kg/m².    Physical Exam  Gen: Pleasant, awake, alert, oriented x 3, no acute distress  HEENT: Mucous membranes moist, no oral lesions, no thrush  NECK: No accessory muscle use, JVP not elevated  Cardiac: RRR, single S1, single S2, no murmurs, no rubs, no gallops  Lungs: CTA b/l; no w/r/c  Abdomen: normoactive bowel sounds, soft nontender, nondistended, no rebound or rigidity, no guarding, obese  Extremities: no cyanosis, no clubbing, no LE edema  MSK:  Strength equal in all extremities  Derm:  No rashes/lesions noted  Neuro:  Appropriate mood/affect    Labs: I have personally reviewed pertinent lab results.  Lab Results   Component Value Date    WBC 6.85 11/10/2023    HGB 17.3 (H) 11/10/2023     11/10/2023     Lab Results   Component Value Date    CREATININE 1.05 12/06/2023        Imaging and other studies: I have personally reviewed pertinent reports.    CT lung screening 12/21/2023  Radiology findings:  LUNGS: 4 mm right lower lobe nodule (series 3 image 148). Stable 8 mm intrafissural node along the left oblique fissure  (series 3 image 128). Stable 3 mm left upper lobe nodule (series 3 image 159).  There is no tracheal or endobronchial lesion.  PLEURA:  Unremarkable.  HEART/GREAT VESSELS: Coronary artery calcifications present. No thoracic aortic aneurysm.  MEDIASTINUM AND MARCELLO:  Unremarkable.  CHEST WALL AND LOWER NECK:  Unremarkable.  VISUALIZED STRUCTURES IN THE UPPER ABDOMEN: Calcified granulomas of the liver. Gallbladder is surgically absent. Colonic diverticulosis.  OSSEOUS STRUCTURES:  No acute fracture or destructive osseous lesion.    Pulmonary Function Testing: I have personally reviewed pertinent reports.   and I have personally reviewed pertinent films in PACS  12/22/2023:  FEV1/FVC Ratio: 67 % (87% predicted)  Forced Vital Capacity: 3.95 L           84 % predicted  FEV1: 2.63 L   74 % predicted  After administration of bronchodilator FEV1: 2.64 (+0%)  Lung volumes by body plethysmography: Total Lung Capacity 96 % predicted Residual volume 116 % predicted  RV/TLC ratio 116%  DLCO corrected for patients hemoglobin level: 58 %    Natan Chung MD  [facility]

## 2024-12-27 ENCOUNTER — HOSPITAL ENCOUNTER (OUTPATIENT)
Dept: CT IMAGING | Facility: HOSPITAL | Age: 69
Discharge: HOME | End: 2024-12-27
Attending: INTERNAL MEDICINE

## 2024-12-27 DIAGNOSIS — Z87.891 PERSONAL HISTORY OF TOBACCO USE: ICD-10-CM

## 2025-01-09 ENCOUNTER — RESULTS FOLLOW-UP (OUTPATIENT)
Dept: PULMONOLOGY | Facility: CLINIC | Age: 70
End: 2025-01-09

## 2025-01-13 ENCOUNTER — OFFICE VISIT (OUTPATIENT)
Dept: PULMONOLOGY | Facility: CLINIC | Age: 70
End: 2025-01-13
Payer: MEDICARE

## 2025-01-13 VITALS
OXYGEN SATURATION: 97 % | HEIGHT: 72 IN | HEART RATE: 54 BPM | SYSTOLIC BLOOD PRESSURE: 142 MMHG | WEIGHT: 287 LBS | BODY MASS INDEX: 38.87 KG/M2 | DIASTOLIC BLOOD PRESSURE: 66 MMHG | TEMPERATURE: 98.2 F

## 2025-01-13 DIAGNOSIS — J45.20 MILD INTERMITTENT REACTIVE AIRWAY DISEASE WITHOUT COMPLICATION: ICD-10-CM

## 2025-01-13 DIAGNOSIS — G47.33 OSA (OBSTRUCTIVE SLEEP APNEA): ICD-10-CM

## 2025-01-13 DIAGNOSIS — Z87.891 PERSONAL HISTORY OF TOBACCO USE: ICD-10-CM

## 2025-01-13 DIAGNOSIS — E66.01 SEVERE OBESITY (HCC): ICD-10-CM

## 2025-01-13 DIAGNOSIS — R91.8 LUNG NODULES: ICD-10-CM

## 2025-01-13 DIAGNOSIS — J31.0 CHRONIC RHINITIS: ICD-10-CM

## 2025-01-13 DIAGNOSIS — F17.211 CIGARETTE NICOTINE DEPENDENCE IN REMISSION: Primary | ICD-10-CM

## 2025-01-13 PROCEDURE — 99214 OFFICE O/P EST MOD 30 MIN: CPT | Performed by: INTERNAL MEDICINE

## 2025-01-13 NOTE — ASSESSMENT & PLAN NOTE
-  quit in 2014  -  1ppd x 25 years; cigar smoking x 10 years  -  continue annual lung cancer screening CT scans

## 2025-01-13 NOTE — PROGRESS NOTES
Name: Zohaib Dutton      : 1955      MRN: [id number]  Encounter Provider: Natan Chung MD  Encounter Date: 2025   Encounter department: [facility]    Assessment:    Patient is 69-year-old male with past medical history significant kidney dependence in remission, stable lung nodules who presents for routine follow-up.  Overall, the patient has done quite well.  He has no complaints.  He is recovering from recent upper respiratory tract infection for which she was given antibiotics.  Otherwise, patient has no complaints.  He is using his inhaled corticosteroid in this postinfectious phase but typically only requires his albuterol.  He has stable lung nodules may continue with his lung cancer screening.  He has FATOU for which he is on CPAP but this is managed by his PCP.  For the patient's obesity, he is considering Ozempic at the discretion of his PCP.  :  Assessment & Plan  Cigarette nicotine dependence in remission    Orders:    CT lung screening program; Future    Severe obesity (HCC)  -Notes weight has been stable.  -Considering Ozempic per primary care         Personal history of tobacco use  -  quit in   -  1ppd x 25 years; cigar smoking x 10 years  -  continue annual lung cancer screening CT scans       Lung nodules  -  stable   -  continue annual lung cancer screening         Chronic rhinitis  -  stable   -  concerned for worsening in spring season  -  continue singulair  -  continue allegra         Mild intermittent reactive airway disease without complication  -May continue albuterol as needed  -Using inhaled corticosteroid around times of viral exacerbation         FATOU (obstructive sleep apnea)  -  managed by PCP  -  uses nightly   -  no issues           I discussed with him that he is a candidate for lung cancer CT screening.     The following Shared Decision-Making points were covered:  Benefits of screening were discussed, including the rates of  reduction in death from lung cancer and other causes.  Harms of screening were reviewed, including false positive tests, radiation exposure levels, risks of invasive procedures, risks of complications of screening, and risk of overdiagnosis.  I counseled on the importance of adherence to annual lung cancer LDCT screening, impact of co-morbidities, and ability or willingness to undergo diagnosis and treatment.  I counseled on the importance of maintaining abstinence as a former smoker or was counseled on the importance of smoking cessation if a current smoker    Review of Eligibility Criteria: He meets all of the criteria for Lung Cancer Screening.   He is 69 y.o.   He has 20 pack year tobacco history and is a current smoker or has quit within the past 15 years  He presents no signs or symptoms of lung cancer    After discussion, the patient decided to elect lung cancer screening.    Patient may follow up in 12 months or sooner as necessary.     Subjective:   He is getting over a cold. He has completed abx.  He is using arnuity, He has albuterol but has not required it.      He denies fevers, chills, nausea, vomiting.      He has been using his cpap daily.  He continued to use it during his cold.      He denies swelling of his legs, chest heaviness or chest pain.      He notes his weight is about stable. He is considering ozempic.      He will have periodically using a albuterol.    Inhaler Regimen:  Arnuity  Albuterol    Remainder of review of systems negative except as described in HPI.      The following portions of the patient's history were reviewed and updated as appropriate: allergies, current medications, past family history, past medical history, past social history, past surgical history and problem list.     Objective:   Vitals: Blood pressure 142/66, pulse (!) 54, temperature 98.2 °F (36.8 °C), temperature source Temporal, height 6' (1.829 m), weight 130 kg (287 lb), SpO2 97%., RA, Body mass index is  38.92 kg/m².    Physical Exam  Gen: Pleasant, awake, alert, oriented x 3, no acute distress  HEENT: Mucous membranes moist, no oral lesions, no thrush  NECK: No accessory muscle use, JVP not elevated  Cardiac: RRR, single S1, single S2, no murmurs, no rubs, no gallops  Lungs: CTA b/l  Abdomen: normoactive bowel sounds, soft nontender, nondistended, no rebound or rigidity, no guarding, obese  Extremities: no cyanosis, no clubbing, no LE edema  MSK:  Strength equal in all extremities  Derm:  No rashes/lesions noted  Neuro:  Appropriate mood/affect    Labs: I have personally reviewed pertinent lab results.  Lab Results   Component Value Date    WBC 6.85 11/10/2023    HGB 17.3 (H) 11/10/2023     11/10/2023     Lab Results   Component Value Date    CREATININE 1.04 06/28/2024        Imaging and other studies: Results Review Statement: I reviewed radiology reports from this admission including: CT chest.  12/27/2024  Radiology findings:  LUNGS: Left upper lobe 5 mm nodule (series 3 image 178), unchanged. No new pulmonary nodules.  Unchanged bilateral oblique fissure nodules, most likely intrapulmonary lymph nodes. Calcified granulomata. Bibasilar nodular calcifications. Minimal bibasilar atelectasis or scarring.  Diffuse bronchial wall thickening consistent with bronchitis, similar to the prior study.  PLEURA: Unremarkable.  HEART/GREAT VESSELS: CABG. Coronary artery stenting. Heart is normal in size. No thoracic aortic aneurysm.  MEDIASTINUM AND MARCELLO: Unremarkable.  CHEST WALL AND LOWER NECK: Unremarkable.  VISUALIZED STRUCTURES IN THE UPPER ABDOMEN: Cholecystectomy. Calcified liver granuloma.  OSSEOUS STRUCTURES: No acute fracture or destructive osseous lesion.    Pulmonary Function Testing: Results Review Statement: I reviewed radiology reports from this admission including: PFT.  12/21/2023:  FEV1/FVC Ratio: 67 % (87% predicted)  Forced Vital Capacity: 3.95 L           84 % predicted  FEV1: 2.63 L74 %  predicted  After administration of bronchodilator FEV1: 2.64 (+0%)  Lung volumes by body plethysmography: Total Lung Capacity 96 % predicted Residual volume 116 % predicted  RV/TLC ratio 116%  DLCO corrected for patients hemoglobin level: 58 %    Natan Chung MD  [facility]

## 2025-01-13 NOTE — ASSESSMENT & PLAN NOTE
-May continue albuterol as needed  -Using inhaled corticosteroid around times of viral exacerbation

## 2025-01-13 NOTE — ASSESSMENT & PLAN NOTE
-  stable   -  concerned for worsening in spring season  -  continue singulair  -  continue allegra